# Patient Record
Sex: FEMALE | Race: OTHER | HISPANIC OR LATINO
[De-identification: names, ages, dates, MRNs, and addresses within clinical notes are randomized per-mention and may not be internally consistent; named-entity substitution may affect disease eponyms.]

---

## 2019-06-24 ENCOUNTER — APPOINTMENT (OUTPATIENT)
Dept: ANTEPARTUM | Facility: CLINIC | Age: 34
End: 2019-06-24
Payer: COMMERCIAL

## 2019-06-24 PROBLEM — Z00.00 ENCOUNTER FOR PREVENTIVE HEALTH EXAMINATION: Status: ACTIVE | Noted: 2019-06-24

## 2019-06-24 PROCEDURE — 76801 OB US < 14 WKS SINGLE FETUS: CPT

## 2019-06-24 PROCEDURE — 76813 OB US NUCHAL MEAS 1 GEST: CPT

## 2019-06-24 PROCEDURE — 36415 COLL VENOUS BLD VENIPUNCTURE: CPT

## 2019-08-16 ENCOUNTER — APPOINTMENT (OUTPATIENT)
Dept: ANTEPARTUM | Facility: CLINIC | Age: 34
End: 2019-08-16
Payer: COMMERCIAL

## 2019-08-16 PROCEDURE — 76811 OB US DETAILED SNGL FETUS: CPT

## 2019-08-16 PROCEDURE — 76817 TRANSVAGINAL US OBSTETRIC: CPT | Mod: 59

## 2019-09-03 ENCOUNTER — APPOINTMENT (OUTPATIENT)
Dept: ANTEPARTUM | Facility: CLINIC | Age: 34
End: 2019-09-03
Payer: COMMERCIAL

## 2019-09-03 PROCEDURE — 76816 OB US FOLLOW-UP PER FETUS: CPT

## 2019-10-17 ENCOUNTER — APPOINTMENT (OUTPATIENT)
Dept: ANTEPARTUM | Facility: CLINIC | Age: 34
End: 2019-10-17
Payer: COMMERCIAL

## 2019-10-17 PROCEDURE — 76817 TRANSVAGINAL US OBSTETRIC: CPT

## 2019-10-17 PROCEDURE — 76816 OB US FOLLOW-UP PER FETUS: CPT

## 2019-11-04 ENCOUNTER — OUTPATIENT (OUTPATIENT)
Dept: OUTPATIENT SERVICES | Facility: HOSPITAL | Age: 34
LOS: 1 days | End: 2019-11-04
Payer: COMMERCIAL

## 2019-11-04 DIAGNOSIS — Z3A.00 WEEKS OF GESTATION OF PREGNANCY NOT SPECIFIED: ICD-10-CM

## 2019-11-04 DIAGNOSIS — O26.899 OTHER SPECIFIED PREGNANCY RELATED CONDITIONS, UNSPECIFIED TRIMESTER: ICD-10-CM

## 2019-11-04 LAB
AMNISURE ROM (RUPTURE OF MEMBRANES): NEGATIVE — SIGNIFICANT CHANGE UP
APPEARANCE UR: CLEAR — SIGNIFICANT CHANGE UP
BILIRUB UR-MCNC: NEGATIVE — SIGNIFICANT CHANGE UP
COLOR SPEC: YELLOW — SIGNIFICANT CHANGE UP
DIFF PNL FLD: NEGATIVE — SIGNIFICANT CHANGE UP
GLUCOSE UR QL: NEGATIVE — SIGNIFICANT CHANGE UP
KETONES UR-MCNC: NEGATIVE — SIGNIFICANT CHANGE UP
LEUKOCYTE ESTERASE UR-ACNC: ABNORMAL
NITRITE UR-MCNC: NEGATIVE — SIGNIFICANT CHANGE UP
PH UR: 8 — SIGNIFICANT CHANGE UP (ref 5–8)
PROT UR-MCNC: NEGATIVE MG/DL — SIGNIFICANT CHANGE UP
SP GR SPEC: 1.01 — SIGNIFICANT CHANGE UP (ref 1–1.03)
UROBILINOGEN FLD QL: 0.2 E.U./DL — SIGNIFICANT CHANGE UP

## 2019-11-04 PROCEDURE — 84112 EVAL AMNIOTIC FLUID PROTEIN: CPT

## 2019-11-04 PROCEDURE — 96360 HYDRATION IV INFUSION INIT: CPT

## 2019-11-04 PROCEDURE — 99214 OFFICE O/P EST MOD 30 MIN: CPT

## 2019-11-04 PROCEDURE — 81001 URINALYSIS AUTO W/SCOPE: CPT

## 2019-11-04 PROCEDURE — 76830 TRANSVAGINAL US NON-OB: CPT | Mod: 26

## 2019-11-04 PROCEDURE — 87086 URINE CULTURE/COLONY COUNT: CPT

## 2019-11-04 PROCEDURE — 99203 OFFICE O/P NEW LOW 30 MIN: CPT | Mod: 25

## 2019-11-04 RX ORDER — SODIUM CHLORIDE 9 MG/ML
1000 INJECTION, SOLUTION INTRAVENOUS
Refills: 0 | Status: DISCONTINUED | OUTPATIENT
Start: 2019-11-04 | End: 2019-11-22

## 2019-11-04 RX ADMIN — Medication 0.25 MILLIGRAM(S): at 18:57

## 2019-11-06 LAB
CULTURE RESULTS: SIGNIFICANT CHANGE UP
SPECIMEN SOURCE: SIGNIFICANT CHANGE UP

## 2019-12-24 ENCOUNTER — OUTPATIENT (OUTPATIENT)
Dept: OUTPATIENT SERVICES | Facility: HOSPITAL | Age: 34
LOS: 1 days | End: 2019-12-24
Payer: COMMERCIAL

## 2019-12-24 DIAGNOSIS — Z3A.00 WEEKS OF GESTATION OF PREGNANCY NOT SPECIFIED: ICD-10-CM

## 2019-12-24 DIAGNOSIS — O26.899 OTHER SPECIFIED PREGNANCY RELATED CONDITIONS, UNSPECIFIED TRIMESTER: ICD-10-CM

## 2019-12-24 PROCEDURE — 76818 FETAL BIOPHYS PROFILE W/NST: CPT

## 2019-12-24 PROCEDURE — 94760 N-INVAS EAR/PLS OXIMETRY 1: CPT

## 2019-12-24 PROCEDURE — 99214 OFFICE O/P EST MOD 30 MIN: CPT

## 2019-12-28 ENCOUNTER — OUTPATIENT (OUTPATIENT)
Dept: OUTPATIENT SERVICES | Facility: HOSPITAL | Age: 34
LOS: 1 days | End: 2019-12-28
Payer: COMMERCIAL

## 2019-12-28 DIAGNOSIS — O26.899 OTHER SPECIFIED PREGNANCY RELATED CONDITIONS, UNSPECIFIED TRIMESTER: ICD-10-CM

## 2019-12-28 DIAGNOSIS — Z3A.00 WEEKS OF GESTATION OF PREGNANCY NOT SPECIFIED: ICD-10-CM

## 2019-12-28 LAB
BLD GP AB SCN SERPL QL: NEGATIVE — SIGNIFICANT CHANGE UP
HCT VFR BLD CALC: 36.6 % — SIGNIFICANT CHANGE UP (ref 34.5–45)
HGB BLD-MCNC: 11.6 G/DL — SIGNIFICANT CHANGE UP (ref 11.5–15.5)
MCHC RBC-ENTMCNC: 28.3 PG — SIGNIFICANT CHANGE UP (ref 27–34)
MCHC RBC-ENTMCNC: 31.7 GM/DL — LOW (ref 32–36)
MCV RBC AUTO: 89.3 FL — SIGNIFICANT CHANGE UP (ref 80–100)
NRBC # BLD: 0 /100 WBCS — SIGNIFICANT CHANGE UP (ref 0–0)
PLATELET # BLD AUTO: 196 K/UL — SIGNIFICANT CHANGE UP (ref 150–400)
RBC # BLD: 4.1 M/UL — SIGNIFICANT CHANGE UP (ref 3.8–5.2)
RBC # FLD: 15.9 % — HIGH (ref 10.3–14.5)
RH IG SCN BLD-IMP: POSITIVE — SIGNIFICANT CHANGE UP
WBC # BLD: 7.64 K/UL — SIGNIFICANT CHANGE UP (ref 3.8–10.5)
WBC # FLD AUTO: 7.64 K/UL — SIGNIFICANT CHANGE UP (ref 3.8–10.5)

## 2019-12-28 PROCEDURE — 86900 BLOOD TYPING SEROLOGIC ABO: CPT

## 2019-12-28 PROCEDURE — 86780 TREPONEMA PALLIDUM: CPT

## 2019-12-28 PROCEDURE — 85027 COMPLETE CBC AUTOMATED: CPT

## 2019-12-28 PROCEDURE — 94760 N-INVAS EAR/PLS OXIMETRY 1: CPT

## 2019-12-28 PROCEDURE — 99214 OFFICE O/P EST MOD 30 MIN: CPT

## 2019-12-28 PROCEDURE — 59025 FETAL NON-STRESS TEST: CPT

## 2019-12-28 PROCEDURE — 86901 BLOOD TYPING SEROLOGIC RH(D): CPT

## 2019-12-28 PROCEDURE — 36415 COLL VENOUS BLD VENIPUNCTURE: CPT

## 2019-12-28 PROCEDURE — 86850 RBC ANTIBODY SCREEN: CPT

## 2019-12-29 LAB — T PALLIDUM AB TITR SER: NEGATIVE — SIGNIFICANT CHANGE UP

## 2019-12-30 ENCOUNTER — RESULT REVIEW (OUTPATIENT)
Age: 34
End: 2019-12-30

## 2019-12-30 ENCOUNTER — INPATIENT (INPATIENT)
Facility: HOSPITAL | Age: 34
LOS: 2 days | Discharge: HOME | End: 2020-01-02
Attending: OBSTETRICS & GYNECOLOGY | Admitting: OBSTETRICS & GYNECOLOGY
Payer: COMMERCIAL

## 2019-12-30 ENCOUNTER — TRANSCRIPTION ENCOUNTER (OUTPATIENT)
Age: 34
End: 2019-12-30

## 2019-12-30 VITALS — WEIGHT: 134.48 LBS | HEIGHT: 62 IN

## 2019-12-30 PROCEDURE — 88307 TISSUE EXAM BY PATHOLOGIST: CPT | Mod: 26

## 2019-12-30 RX ORDER — SODIUM CHLORIDE 9 MG/ML
1000 INJECTION, SOLUTION INTRAVENOUS ONCE
Refills: 0 | Status: COMPLETED | OUTPATIENT
Start: 2019-12-30 | End: 2019-12-30

## 2019-12-30 RX ORDER — OXYCODONE HYDROCHLORIDE 5 MG/1
5 TABLET ORAL ONCE
Refills: 0 | Status: DISCONTINUED | OUTPATIENT
Start: 2019-12-30 | End: 2020-01-02

## 2019-12-30 RX ORDER — OXYTOCIN 10 UNIT/ML
333.33 VIAL (ML) INJECTION
Qty: 20 | Refills: 0 | Status: DISCONTINUED | OUTPATIENT
Start: 2019-12-30 | End: 2020-01-02

## 2019-12-30 RX ORDER — GLYCERIN ADULT
1 SUPPOSITORY, RECTAL RECTAL AT BEDTIME
Refills: 0 | Status: DISCONTINUED | OUTPATIENT
Start: 2019-12-30 | End: 2020-01-02

## 2019-12-30 RX ORDER — SODIUM CHLORIDE 9 MG/ML
1000 INJECTION, SOLUTION INTRAVENOUS
Refills: 0 | Status: DISCONTINUED | OUTPATIENT
Start: 2019-12-30 | End: 2019-12-30

## 2019-12-30 RX ORDER — IBUPROFEN 200 MG
600 TABLET ORAL EVERY 6 HOURS
Refills: 0 | Status: COMPLETED | OUTPATIENT
Start: 2019-12-30 | End: 2020-11-27

## 2019-12-30 RX ORDER — NALOXONE HYDROCHLORIDE 4 MG/.1ML
0.1 SPRAY NASAL
Refills: 0 | Status: DISCONTINUED | OUTPATIENT
Start: 2019-12-30 | End: 2020-01-02

## 2019-12-30 RX ORDER — OXYTOCIN 10 UNIT/ML
333.33 VIAL (ML) INJECTION
Qty: 20 | Refills: 0 | Status: DISCONTINUED | OUTPATIENT
Start: 2019-12-30 | End: 2019-12-30

## 2019-12-30 RX ORDER — TETANUS TOXOID, REDUCED DIPHTHERIA TOXOID AND ACELLULAR PERTUSSIS VACCINE, ADSORBED 5; 2.5; 8; 8; 2.5 [IU]/.5ML; [IU]/.5ML; UG/.5ML; UG/.5ML; UG/.5ML
0.5 SUSPENSION INTRAMUSCULAR ONCE
Refills: 0 | Status: DISCONTINUED | OUTPATIENT
Start: 2019-12-30 | End: 2020-01-02

## 2019-12-30 RX ORDER — ACETAMINOPHEN 500 MG
975 TABLET ORAL
Refills: 0 | Status: DISCONTINUED | OUTPATIENT
Start: 2019-12-30 | End: 2020-01-02

## 2019-12-30 RX ORDER — FAMOTIDINE 10 MG/ML
20 INJECTION INTRAVENOUS ONCE
Refills: 0 | Status: COMPLETED | OUTPATIENT
Start: 2019-12-30 | End: 2019-12-30

## 2019-12-30 RX ORDER — SODIUM CHLORIDE 9 MG/ML
1000 INJECTION, SOLUTION INTRAVENOUS
Refills: 0 | Status: DISCONTINUED | OUTPATIENT
Start: 2019-12-30 | End: 2020-01-02

## 2019-12-30 RX ORDER — CEFAZOLIN SODIUM 1 G
2000 VIAL (EA) INJECTION ONCE
Refills: 0 | Status: COMPLETED | OUTPATIENT
Start: 2019-12-30 | End: 2019-12-30

## 2019-12-30 RX ORDER — SIMETHICONE 80 MG/1
80 TABLET, CHEWABLE ORAL EVERY 4 HOURS
Refills: 0 | Status: DISCONTINUED | OUTPATIENT
Start: 2019-12-30 | End: 2020-01-02

## 2019-12-30 RX ORDER — KETOROLAC TROMETHAMINE 30 MG/ML
30 SYRINGE (ML) INJECTION EVERY 6 HOURS
Refills: 0 | Status: DISCONTINUED | OUTPATIENT
Start: 2019-12-30 | End: 2019-12-31

## 2019-12-30 RX ORDER — ONDANSETRON 8 MG/1
4 TABLET, FILM COATED ORAL EVERY 6 HOURS
Refills: 0 | Status: DISCONTINUED | OUTPATIENT
Start: 2019-12-30 | End: 2020-01-02

## 2019-12-30 RX ORDER — LANOLIN
1 OINTMENT (GRAM) TOPICAL EVERY 6 HOURS
Refills: 0 | Status: DISCONTINUED | OUTPATIENT
Start: 2019-12-30 | End: 2020-01-02

## 2019-12-30 RX ORDER — METOCLOPRAMIDE HCL 10 MG
10 TABLET ORAL ONCE
Refills: 0 | Status: DISCONTINUED | OUTPATIENT
Start: 2019-12-30 | End: 2019-12-30

## 2019-12-30 RX ORDER — MORPHINE SULFATE 50 MG/1
0.2 CAPSULE, EXTENDED RELEASE ORAL ONCE
Refills: 0 | Status: DISCONTINUED | OUTPATIENT
Start: 2019-12-30 | End: 2020-01-02

## 2019-12-30 RX ORDER — DIPHENHYDRAMINE HCL 50 MG
25 CAPSULE ORAL EVERY 6 HOURS
Refills: 0 | Status: DISCONTINUED | OUTPATIENT
Start: 2019-12-30 | End: 2020-01-02

## 2019-12-30 RX ORDER — OXYCODONE HYDROCHLORIDE 5 MG/1
5 TABLET ORAL ONCE
Refills: 0 | Status: DISCONTINUED | OUTPATIENT
Start: 2019-12-30 | End: 2019-12-30

## 2019-12-30 RX ORDER — OXYCODONE HYDROCHLORIDE 5 MG/1
5 TABLET ORAL
Refills: 0 | Status: DISCONTINUED | OUTPATIENT
Start: 2019-12-30 | End: 2020-01-02

## 2019-12-30 RX ORDER — CITRIC ACID/SODIUM CITRATE 300-500 MG
30 SOLUTION, ORAL ORAL ONCE
Refills: 0 | Status: COMPLETED | OUTPATIENT
Start: 2019-12-30 | End: 2019-12-30

## 2019-12-30 RX ORDER — NALBUPHINE HYDROCHLORIDE 10 MG/ML
2.5 INJECTION, SOLUTION INTRAMUSCULAR; INTRAVENOUS; SUBCUTANEOUS EVERY 6 HOURS
Refills: 0 | Status: DISCONTINUED | OUTPATIENT
Start: 2019-12-30 | End: 2020-01-02

## 2019-12-30 RX ORDER — MAGNESIUM HYDROXIDE 400 MG/1
30 TABLET, CHEWABLE ORAL
Refills: 0 | Status: DISCONTINUED | OUTPATIENT
Start: 2019-12-30 | End: 2020-01-02

## 2019-12-30 RX ADMIN — Medication 1000 MILLIUNIT(S)/MIN: at 10:13

## 2019-12-30 RX ADMIN — Medication 30 MILLIGRAM(S): at 17:34

## 2019-12-30 RX ADMIN — FAMOTIDINE 20 MILLIGRAM(S): 10 INJECTION INTRAVENOUS at 08:15

## 2019-12-30 RX ADMIN — Medication 30 MILLIGRAM(S): at 13:28

## 2019-12-30 RX ADMIN — Medication 30 MILLILITER(S): at 08:15

## 2019-12-30 RX ADMIN — NALBUPHINE HYDROCHLORIDE 2.5 MILLIGRAM(S): 10 INJECTION, SOLUTION INTRAMUSCULAR; INTRAVENOUS; SUBCUTANEOUS at 13:28

## 2019-12-30 RX ADMIN — SODIUM CHLORIDE 125 MILLILITER(S): 9 INJECTION, SOLUTION INTRAVENOUS at 07:42

## 2019-12-30 RX ADMIN — Medication 975 MILLIGRAM(S): at 21:34

## 2019-12-30 RX ADMIN — SODIUM CHLORIDE 125 MILLILITER(S): 9 INJECTION, SOLUTION INTRAVENOUS at 19:51

## 2019-12-30 RX ADMIN — Medication 30 MILLIGRAM(S): at 00:40

## 2019-12-30 RX ADMIN — Medication 30 MILLIGRAM(S): at 18:42

## 2019-12-30 RX ADMIN — Medication 30 MILLIGRAM(S): at 12:11

## 2019-12-30 RX ADMIN — Medication 100 MILLIGRAM(S): at 08:15

## 2019-12-30 RX ADMIN — SODIUM CHLORIDE 2000 MILLILITER(S): 9 INJECTION, SOLUTION INTRAVENOUS at 07:06

## 2019-12-30 RX ADMIN — OXYCODONE HYDROCHLORIDE 5 MILLIGRAM(S): 5 TABLET ORAL at 23:00

## 2019-12-30 RX ADMIN — NALBUPHINE HYDROCHLORIDE 2.5 MILLIGRAM(S): 10 INJECTION, SOLUTION INTRAMUSCULAR; INTRAVENOUS; SUBCUTANEOUS at 13:29

## 2019-12-30 RX ADMIN — OXYCODONE HYDROCHLORIDE 5 MILLIGRAM(S): 5 TABLET ORAL at 22:20

## 2019-12-30 RX ADMIN — Medication 975 MILLIGRAM(S): at 22:10

## 2019-12-30 NOTE — PROGRESS NOTE ADULT - SUBJECTIVE AND OBJECTIVE BOX
Section Operative Note      Pre-Op Diagnosis:  Elelctive  primary    secoin    Post-Op Diagnosis:  same    Time Out: 	[x ] Performed		[ ]Not Performed:  Procedure: 	 Section: 	[ ] Repeat 	#_______	[ x] Primary         [ ]Emergency	        [ ]Other____________:  Uterine incision:         [ x]Low Transverse C/S	[ ]Low Vertical C/S           [ ]Classical C/S							  Additional Procedures: 	[ ] Bilateral Tubal Ligation.  Type:______________  Other:	[ ]Lysis of Adhesions   	[ ]C-Hysterectomy          [ ]Other__________________:  Surgeon:  Dr. Smith                                                   .	Assist:   __Dr ENAMORADO _________________.                                                                             .   Anesthesia: _______BOK_______________________	Type: [ ]Epidural  [ x] Spinal   [ ] General	[ ]Other:  IV Fluids:150CC IVRL 20  U PTIOCIN 2  G  ANCEF 					Urine Output:		Dotson:  [ ] Yes [ ]No [ ] Other:  EBL:    	900CC	  Delivery findings:    [X ] Live 	[ ]Non-Viable: 	[ ]MALE   [ X]FEMALE, Infant. APGAR        9     AND    9             .  [X ] Peds at delivery.  [ ]MULTIPLE GESTATION -NOTES:      POSITION:      LOT                  PRESENTATION          VTX                       .  DELIVERED BY:  	[ X]HEAD 		[ ]BREECH 	[ ]OTHER:  		[X ]MANUAL 		[ ]VACUUM	[ ] OTHER:	  PLACENTA: 	[x ]Removed	[ x]Uterus explored		[x ]Empty		[ ]Other 		  		UTERUS:  	[ x]Normal		[ ]Fibroids		[ ] Other:  ADNEXA: 	[ x]Right Normal	[ ]Other:  	[x ]Left Normal	[ ]Other :  PELVIS:	[ x]Normal		[ ]Adhesions [ ]Mild  [ ]Moderate [ ]Severe  Procedure:  	Patient in supine position.    Skin Incision	[x ]Pfannenstiel	[ ]Other:			[ ]Old scar  removal.  		Fascial Incision	[ x]Transverse 	[ ]Other:		  Peritoneal incision	[x ]Performed	[ x]Vertical  [ ]Other:		[ ]Lysis of Adhesions  		Bladder Flap	[ ]Created	[ ]Not Created  		Uterine Incision	[x ]Low transverse	[ ]Low Vertical	[ ]Classical  [ ]Other:   	Uterine Repair	[ x]_#_1_______Layers-Using__1 chromic______________ sutures 	[x ] hemostasis  excellent.  				[ ]Other:                       .                      Abdominal Cavity	[x ]Cleared of clots and debris  Rectus  Muscles  	Reapproximated [x ]Yes Using__1 chromic______________ sutures. [ ]Not Re- Approximated.  Fascial Reapproximation 	[ x]UsingUsing___1 Vicril_____________ sutures [ ]Other:                                 .	  Subcutaneous Tissue 	[x ]Irrigated  and hemostasis assured:[x ]Reapproximated Using____2-0____________ sutures.  Skin Reapproximation:	[x ]Subcuticular Using________________ sutures [x ] Insorb Staples   [ ]Skin Staples [ ]Other:  Dressing applied  and Patient to RR in  [x ] Stable [ ] Other ;                         condition.	  End of Operation;	[x ] Sponge/lap/needle count correct.  [ ] Not correct.  [ ]Not Done [ ]X-ray=Clear  Pathology:	[ x]Placenta.   	[ ]Fallopian Tube Portions [ ]Right [ ]Left.	[ ]Other:                                           	Complications/Attending’s Notes:	[ ] None.  	[ ] Other:                                                                        [ ]CONTINUATION OF NOTES NEXT PAGE:

## 2019-12-30 NOTE — DISCHARGE NOTE OB - CARE PROVIDER_API CALL
Tim Smith)  Obstetrics and Gynecology  [address] [place] [id number]  Phone: [phone]  Fax: [phone]  Follow Up Time:

## 2019-12-30 NOTE — DISCHARGE NOTE OB - PATIENT PORTAL LINK FT
You can access the FollowMyHealth Patient Portal offered by [facility] by registering at the following website: [email or web address]. By joining "[employer]’s FollowMyHealth portal, you will also be able to view your health information using other applications (apps) compatible with our system.

## 2019-12-30 NOTE — DISCHARGE NOTE OB - CARE PLAN
Principal Discharge DX:	Normal postpartum course  Goal:	Home  Assessment and plan of treatment:	Nothing  per  vagina  no  heavy  lifting for  six  weeks ,  No  sex No  tub bathc no swimming Principal Discharge DX:	Normal postpartum course  Goal:	Home  Assessment and plan of treatment:	Nothing  per  vagina  no  heavy  lifting for  six  weeks ,  No  sex No  tub bath no swimming

## 2019-12-30 NOTE — DISCHARGE NOTE OB - PLAN OF CARE
Home Nothing  per  vagina  no  heavy  lifting for  six  weeks ,  No  sex No  tub bathc no swimming Nothing  per  vagina  no  heavy  lifting for  six  weeks ,  No  sex No  tub bath no swimming

## 2019-12-31 LAB
BASOPHILS # BLD AUTO: 0.02 K/UL — SIGNIFICANT CHANGE UP (ref 0–0.2)
BASOPHILS NFR BLD AUTO: 0.2 % — SIGNIFICANT CHANGE UP (ref 0–2)
EOSINOPHIL # BLD AUTO: 0.14 K/UL — SIGNIFICANT CHANGE UP (ref 0–0.5)
EOSINOPHIL NFR BLD AUTO: 1.4 % — SIGNIFICANT CHANGE UP (ref 0–6)
HCT VFR BLD CALC: 30.9 % — LOW (ref 34.5–45)
HGB BLD-MCNC: 9.9 G/DL — LOW (ref 11.5–15.5)
IMM GRANULOCYTES NFR BLD AUTO: 0.4 % — SIGNIFICANT CHANGE UP (ref 0–1.5)
LYMPHOCYTES # BLD AUTO: 1.56 K/UL — SIGNIFICANT CHANGE UP (ref 1–3.3)
LYMPHOCYTES # BLD AUTO: 16.1 % — SIGNIFICANT CHANGE UP (ref 13–44)
MCHC RBC-ENTMCNC: 28.4 PG — SIGNIFICANT CHANGE UP (ref 27–34)
MCHC RBC-ENTMCNC: 32 GM/DL — SIGNIFICANT CHANGE UP (ref 32–36)
MCV RBC AUTO: 88.8 FL — SIGNIFICANT CHANGE UP (ref 80–100)
MONOCYTES # BLD AUTO: 0.64 K/UL — SIGNIFICANT CHANGE UP (ref 0–0.9)
MONOCYTES NFR BLD AUTO: 6.6 % — SIGNIFICANT CHANGE UP (ref 2–14)
NEUTROPHILS # BLD AUTO: 7.3 K/UL — SIGNIFICANT CHANGE UP (ref 1.8–7.4)
NEUTROPHILS NFR BLD AUTO: 75.3 % — SIGNIFICANT CHANGE UP (ref 43–77)
NRBC # BLD: 0 /100 WBCS — SIGNIFICANT CHANGE UP (ref 0–0)
PLATELET # BLD AUTO: 159 K/UL — SIGNIFICANT CHANGE UP (ref 150–400)
RBC # BLD: 3.48 M/UL — LOW (ref 3.8–5.2)
RBC # FLD: 16.4 % — HIGH (ref 10.3–14.5)
WBC # BLD: 9.7 K/UL — SIGNIFICANT CHANGE UP (ref 3.8–10.5)
WBC # FLD AUTO: 9.7 K/UL — SIGNIFICANT CHANGE UP (ref 3.8–10.5)

## 2019-12-31 RX ORDER — OXYCODONE HYDROCHLORIDE 5 MG/1
5 TABLET ORAL ONCE
Refills: 0 | Status: DISCONTINUED | OUTPATIENT
Start: 2019-12-31 | End: 2019-12-31

## 2019-12-31 RX ORDER — IBUPROFEN 200 MG
600 TABLET ORAL EVERY 6 HOURS
Refills: 0 | Status: DISCONTINUED | OUTPATIENT
Start: 2019-12-31 | End: 2020-01-02

## 2019-12-31 RX ORDER — OXYCODONE HYDROCHLORIDE 5 MG/1
5 TABLET ORAL EVERY 6 HOURS
Refills: 0 | Status: DISCONTINUED | OUTPATIENT
Start: 2019-12-31 | End: 2020-01-01

## 2019-12-31 RX ADMIN — Medication 975 MILLIGRAM(S): at 09:59

## 2019-12-31 RX ADMIN — Medication 975 MILLIGRAM(S): at 21:00

## 2019-12-31 RX ADMIN — SIMETHICONE 80 MILLIGRAM(S): 80 TABLET, CHEWABLE ORAL at 21:48

## 2019-12-31 RX ADMIN — Medication 30 MILLIGRAM(S): at 06:15

## 2019-12-31 RX ADMIN — OXYCODONE HYDROCHLORIDE 5 MILLIGRAM(S): 5 TABLET ORAL at 12:47

## 2019-12-31 RX ADMIN — Medication 25 MILLIGRAM(S): at 00:43

## 2019-12-31 RX ADMIN — Medication 975 MILLIGRAM(S): at 05:00

## 2019-12-31 RX ADMIN — Medication 600 MILLIGRAM(S): at 19:22

## 2019-12-31 RX ADMIN — NALBUPHINE HYDROCHLORIDE 2.5 MILLIGRAM(S): 10 INJECTION, SOLUTION INTRAMUSCULAR; INTRAVENOUS; SUBCUTANEOUS at 04:10

## 2019-12-31 RX ADMIN — Medication 600 MILLIGRAM(S): at 23:10

## 2019-12-31 RX ADMIN — NALBUPHINE HYDROCHLORIDE 2.5 MILLIGRAM(S): 10 INJECTION, SOLUTION INTRAMUSCULAR; INTRAVENOUS; SUBCUTANEOUS at 05:00

## 2019-12-31 RX ADMIN — SIMETHICONE 80 MILLIGRAM(S): 80 TABLET, CHEWABLE ORAL at 17:51

## 2019-12-31 RX ADMIN — Medication 600 MILLIGRAM(S): at 11:53

## 2019-12-31 RX ADMIN — MAGNESIUM HYDROXIDE 30 MILLILITER(S): 400 TABLET, CHEWABLE ORAL at 19:34

## 2019-12-31 RX ADMIN — Medication 600 MILLIGRAM(S): at 11:16

## 2019-12-31 RX ADMIN — SIMETHICONE 80 MILLIGRAM(S): 80 TABLET, CHEWABLE ORAL at 09:54

## 2019-12-31 RX ADMIN — Medication 600 MILLIGRAM(S): at 18:27

## 2019-12-31 RX ADMIN — OXYCODONE HYDROCHLORIDE 5 MILLIGRAM(S): 5 TABLET ORAL at 18:30

## 2019-12-31 RX ADMIN — Medication 975 MILLIGRAM(S): at 14:22

## 2019-12-31 RX ADMIN — Medication 30 MILLIGRAM(S): at 07:00

## 2019-12-31 RX ADMIN — SIMETHICONE 80 MILLIGRAM(S): 80 TABLET, CHEWABLE ORAL at 13:19

## 2019-12-31 RX ADMIN — OXYCODONE HYDROCHLORIDE 5 MILLIGRAM(S): 5 TABLET ORAL at 17:50

## 2019-12-31 RX ADMIN — OXYCODONE HYDROCHLORIDE 5 MILLIGRAM(S): 5 TABLET ORAL at 11:53

## 2019-12-31 RX ADMIN — Medication 975 MILLIGRAM(S): at 04:11

## 2019-12-31 RX ADMIN — Medication 975 MILLIGRAM(S): at 15:15

## 2019-12-31 RX ADMIN — Medication 975 MILLIGRAM(S): at 20:31

## 2019-12-31 RX ADMIN — Medication 975 MILLIGRAM(S): at 10:30

## 2019-12-31 RX ADMIN — Medication 30 MILLIGRAM(S): at 00:05

## 2019-12-31 RX ADMIN — OXYCODONE HYDROCHLORIDE 5 MILLIGRAM(S): 5 TABLET ORAL at 19:34

## 2019-12-31 NOTE — PROGRESS NOTE ADULT - SUBJECTIVE AND OBJECTIVE BOX
Patient evaluated at bedside this morning, resting comfortable in bed, with no acute events overnight.  She reports pain is well controlled with motrin and tylenol.  She denies headache, dizziness, chest pain, palpitations, shortness of breath, nausea, vomiting or heavy vaginal bleeding.  She has not tried ambulating since procedure, shi removed last night and passing trial of voids. Tolerating clear liquids.     Physical Exam:  Vital Signs Last 24 Hrs  T(C): 36.8 (31 Dec 2019 02:00), Max: 36.8 (31 Dec 2019 02:00)  T(F): 98.3 (31 Dec 2019 02:00), Max: 98.3 (31 Dec 2019 02:00)  HR: 66 (31 Dec 2019 02:00) (66 - 85)  BP: 93/48 (31 Dec 2019 02:00) (93/48 - 118/54)  BP(mean): --  RR: 17 (31 Dec 2019 02:00) (17 - 18)  SpO2: 98% (31 Dec 2019 02:00) (96% - 99%)    GA: NAD, A+0 x 3  CV: RRR, no murmurs/rubs  Pulm: CTAB, no wheezes/rhonchi  Breasts: soft, nontender, no palpable masses  Abd: + BS, soft, nontender, nondistended, no rebound or guarding, incision clean, dry and intact, uterus firm at midline and below umbilicus  : shi in situ, lochia WNL  Extremities: no swelling or calf tenderness, reflexes +2 bilaterally, SCD in place                  acetaminophen   Tablet .. 975 milliGRAM(s) Oral <User Schedule>  diphenhydrAMINE 25 milliGRAM(s) Oral every 6 hours PRN  diphtheria/tetanus/pertussis (acellular) Vaccine (ADAcel) 0.5 milliLiter(s) IntraMuscular once  glycerin Suppository - Adult 1 Suppository(s) Rectal at bedtime PRN  ibuprofen  Tablet. 600 milliGRAM(s) Oral every 6 hours  lactated ringers. 1000 milliLiter(s) IV Continuous <Continuous>  lanolin Ointment 1 Application(s) Topical every 6 hours PRN  magnesium hydroxide Suspension 30 milliLiter(s) Oral two times a day PRN  morphine PF Spinal 0.2 milliGRAM(s) IntraThecal. once  nalbuphine Injectable 2.5 milliGRAM(s) IV Push every 6 hours PRN  naloxone Injectable 0.1 milliGRAM(s) IV Push every 3 minutes PRN  ondansetron Injectable 4 milliGRAM(s) IV Push every 6 hours PRN  oxyCODONE    IR 5 milliGRAM(s) Oral every 3 hours PRN  oxyCODONE    IR 5 milliGRAM(s) Oral once PRN  oxytocin Infusion 333.333 milliUNIT(s)/Min IV Continuous <Continuous>  simethicone 80 milliGRAM(s) Chew every 4 hours PRN Patient evaluated at bedside this morning, resting comfortable in bed, with no acute events overnight.  She reports pain is well controlled with motrin, tylenol, oxycodone.  Endorsed pain last night so did not attempt ambulation.  She denies headache, dizziness, chest pain, palpitations, shortness of breath, nausea, vomiting or heavy vaginal bleeding.  She has not tried ambulating since procedure, shi removed last night and passing trial of voids. Tolerating clear liquids.     Physical Exam:  Vital Signs Last 24 Hrs  T(C): 36.8 (31 Dec 2019 02:00), Max: 36.8 (31 Dec 2019 02:00)  T(F): 98.3 (31 Dec 2019 02:00), Max: 98.3 (31 Dec 2019 02:00)  HR: 66 (31 Dec 2019 02:00) (66 - 85)  BP: 93/48 (31 Dec 2019 02:00) (93/48 - 118/54)  BP(mean): --  RR: 17 (31 Dec 2019 02:00) (17 - 18)  SpO2: 98% (31 Dec 2019 02:00) (96% - 99%)    GA: NAD, A+0 x 3  CV: RRR, no murmurs/rubs  Pulm: CTAB, no wheezes/rhonchi  Breasts: soft, nontender, no palpable masses  Abd: + BS, soft, nontender, nondistended, no rebound or guarding, incision clean, dry and intact, uterus firm at midline and below umbilicus  : shi in situ, lochia WNL  Extremities: no swelling or calf tenderness, reflexes +2 bilaterally, SCD in place                  acetaminophen   Tablet .. 975 milliGRAM(s) Oral <User Schedule>  diphenhydrAMINE 25 milliGRAM(s) Oral every 6 hours PRN  diphtheria/tetanus/pertussis (acellular) Vaccine (ADAcel) 0.5 milliLiter(s) IntraMuscular once  glycerin Suppository - Adult 1 Suppository(s) Rectal at bedtime PRN  ibuprofen  Tablet. 600 milliGRAM(s) Oral every 6 hours  lactated ringers. 1000 milliLiter(s) IV Continuous <Continuous>  lanolin Ointment 1 Application(s) Topical every 6 hours PRN  magnesium hydroxide Suspension 30 milliLiter(s) Oral two times a day PRN  morphine PF Spinal 0.2 milliGRAM(s) IntraThecal. once  nalbuphine Injectable 2.5 milliGRAM(s) IV Push every 6 hours PRN  naloxone Injectable 0.1 milliGRAM(s) IV Push every 3 minutes PRN  ondansetron Injectable 4 milliGRAM(s) IV Push every 6 hours PRN  oxyCODONE    IR 5 milliGRAM(s) Oral every 3 hours PRN  oxyCODONE    IR 5 milliGRAM(s) Oral once PRN  oxytocin Infusion 333.333 milliUNIT(s)/Min IV Continuous <Continuous>  simethicone 80 milliGRAM(s) Chew every 4 hours PRN

## 2019-12-31 NOTE — PROGRESS NOTE ADULT - SUBJECTIVE AND OBJECTIVE BOX
OB/GYN ATTENDING POSTOP ROUNDS                                    Subjective:  34yFemale    Complaints: [x ]None	[ ]Other:      Objective:  		Vital Signs:  T(C): 36.6 (19 @ 06:00), Max: 36.8 (19 @ 02:00)  HR: 61 (19 @ 06:00) (61 - 85)  BP: 90/51 (19 @ 06:00) (90/51 - 118/54)  RR: 17 (19 @ 06:00) (17 - 18)  SpO2: 97% (19 @ 06:00) (96% - 99%)  Wt(kg): --     @ 07:01  -   @ 07:00  --------------------------------------------------------  IN: 3550 mL / OUT: 4695 mL / NET: -1145 mL        		General:      NAD 	[x ] Yes 	[ ]No,	 A&O X3	[ x] Yes  [ ] No			  		Abdomen:   Palpation  	[x ]Normal	[ ]Other:	  	   Incision:              [x  ]Intact	   [ x] Clean	[x ] Dry    [   ]other:  BS		[ x]Normal 	[ ]Other:  			  LE:  	Calf tenderness    	[ x]None		[x ]No  Sign of DVT	[ ]Other:  		Lochia:	 		[ x]Normal,	[ ]Other:  		Labs:	                      9.9    9.70  )-----------( 159      ( 31 Dec 2019 08:21 )             30.9   	       Assessment:  			[x ] Post-op  Section  			Day #_1_______  				[ ] Other:     	Plan:	1.  Supportive Care		[ ]Other:                                           2. Pain:		[ x] Well Controlled 	[ x] Other:DISCUSSED  RISKS  OF STAYING IN  BED  EMPHASIZED  IMPORTACE  OF  AMBULATUIO AND GETTING OUT  OF BED ,  MATERIAL  RISKS  OF  IMMOBILITY  DISCSSED  AMONG  THEM  DVT  FEVER  INFECTIONS  ETC .  	           	     	3. Misc.		[ x]Continue current care                                            4. Discharge home on 2 if stable	[ ] Other:  		  Notes:			[x ] None			[ ] Other:      													Tim Smith MD [phone]

## 2019-12-31 NOTE — LACTATION INITIAL EVALUATION - NS LACT CON REASON FOR REQ
Met dyad at ~ 32 hours. Weight loss and diaper count WDL. Mother with nipple soreness. Importance of deep latch explained and technique for acquiring deep latch taught. Baby was able to latch deeply, sucking rhythmically between periods of rest. Breastfeeding basics and normal  behavior discussed. Baby has been bottle fed several times at mother’s request due to pain level (incision). Supported the mother in her decisions. Implications and risks of formula or mixed feeding was discussed. Supply and demand feedback system for milk production explained. Encouraged frequent SSC and to breastfeed in response to cues at least 8-12x/day, or as tolerated. Pumping and/or hand expression recommended for any missed feeds. Encouraged to call for additional support as needed from RN or LC./primaparous mom

## 2020-01-01 RX ORDER — POLYETHYLENE GLYCOL 3350 17 G/17G
17 POWDER, FOR SOLUTION ORAL DAILY
Refills: 0 | Status: DISCONTINUED | OUTPATIENT
Start: 2020-01-01 | End: 2020-01-02

## 2020-01-01 RX ORDER — OXYCODONE HYDROCHLORIDE 5 MG/1
10 TABLET ORAL ONCE
Refills: 0 | Status: DISCONTINUED | OUTPATIENT
Start: 2020-01-01 | End: 2020-01-01

## 2020-01-01 RX ADMIN — Medication 975 MILLIGRAM(S): at 04:00

## 2020-01-01 RX ADMIN — Medication 600 MILLIGRAM(S): at 05:47

## 2020-01-01 RX ADMIN — Medication 975 MILLIGRAM(S): at 15:05

## 2020-01-01 RX ADMIN — OXYCODONE HYDROCHLORIDE 5 MILLIGRAM(S): 5 TABLET ORAL at 09:50

## 2020-01-01 RX ADMIN — OXYCODONE HYDROCHLORIDE 5 MILLIGRAM(S): 5 TABLET ORAL at 01:37

## 2020-01-01 RX ADMIN — OXYCODONE HYDROCHLORIDE 5 MILLIGRAM(S): 5 TABLET ORAL at 10:30

## 2020-01-01 RX ADMIN — Medication 975 MILLIGRAM(S): at 21:30

## 2020-01-01 RX ADMIN — OXYCODONE HYDROCHLORIDE 10 MILLIGRAM(S): 5 TABLET ORAL at 23:15

## 2020-01-01 RX ADMIN — Medication 975 MILLIGRAM(S): at 03:12

## 2020-01-01 RX ADMIN — Medication 975 MILLIGRAM(S): at 10:00

## 2020-01-01 RX ADMIN — Medication 975 MILLIGRAM(S): at 20:51

## 2020-01-01 RX ADMIN — OXYCODONE HYDROCHLORIDE 5 MILLIGRAM(S): 5 TABLET ORAL at 16:30

## 2020-01-01 RX ADMIN — SIMETHICONE 80 MILLIGRAM(S): 80 TABLET, CHEWABLE ORAL at 09:21

## 2020-01-01 RX ADMIN — Medication 600 MILLIGRAM(S): at 18:58

## 2020-01-01 RX ADMIN — Medication 600 MILLIGRAM(S): at 18:17

## 2020-01-01 RX ADMIN — OXYCODONE HYDROCHLORIDE 5 MILLIGRAM(S): 5 TABLET ORAL at 15:49

## 2020-01-01 RX ADMIN — OXYCODONE HYDROCHLORIDE 5 MILLIGRAM(S): 5 TABLET ORAL at 02:15

## 2020-01-01 RX ADMIN — Medication 975 MILLIGRAM(S): at 09:21

## 2020-01-01 RX ADMIN — Medication 600 MILLIGRAM(S): at 12:09

## 2020-01-01 RX ADMIN — SIMETHICONE 80 MILLIGRAM(S): 80 TABLET, CHEWABLE ORAL at 15:49

## 2020-01-01 RX ADMIN — Medication 600 MILLIGRAM(S): at 00:00

## 2020-01-01 RX ADMIN — Medication 600 MILLIGRAM(S): at 13:00

## 2020-01-01 RX ADMIN — Medication 975 MILLIGRAM(S): at 15:45

## 2020-01-01 RX ADMIN — SIMETHICONE 80 MILLIGRAM(S): 80 TABLET, CHEWABLE ORAL at 03:12

## 2020-01-01 RX ADMIN — Medication 600 MILLIGRAM(S): at 06:30

## 2020-01-01 NOTE — PROGRESS NOTE ADULT - SUBJECTIVE AND OBJECTIVE BOX
Patient evaluated at bedside this morning, resting comfortable in bed.   She reports pain is not well controlled with tylenol, motrin and oxycodone. Reporting feeling extreme gas pain although is ambulating and passing gas.   She denies headache, dizziness, chest pain, palpitations, shortness of breathe, nausea, vomiting or heavy vaginal bleeding.  She has been ambulating without assistance, voiding spontaneously, passing gas, tolerating regular diet and is breastfeeding.    Physical Exam:  Vital Signs Last 24 Hrs  T(C): 36.2 (01 Jan 2020 06:12), Max: 37 (31 Dec 2019 22:15)  T(F): 97.2 (01 Jan 2020 06:12), Max: 98.6 (31 Dec 2019 22:15)  HR: 67 (01 Jan 2020 06:12) (67 - 90)  BP: 113/70 (01 Jan 2020 06:12) (93/55 - 113/70)  BP(mean): --  RR: 17 (01 Jan 2020 06:12) (17 - 17)  SpO2: 100% (01 Jan 2020 06:12) (97% - 100%)    GA: NAD, A+0 x 3  CV: RRR  Pulm: CTAB  Breasts: soft, nontender, no palpable masses  Abd: + BS, soft, tender to palpation in all 4 quadrants, nondistended, no rebound or guarding, incision clean, dry and intact, uterus firm at midline,  2 fb below umbilicus  : lochia WNL  Extremities: no swelling or calf tenderness                             9.9    9.70  )-----------( 159      ( 31 Dec 2019 08:21 )             30.9

## 2020-01-02 VITALS
SYSTOLIC BLOOD PRESSURE: 115 MMHG | OXYGEN SATURATION: 98 % | TEMPERATURE: 98 F | RESPIRATION RATE: 17 BRPM | HEART RATE: 67 BPM | DIASTOLIC BLOOD PRESSURE: 75 MMHG

## 2020-01-02 PROCEDURE — 85025 COMPLETE CBC W/AUTO DIFF WBC: CPT

## 2020-01-02 PROCEDURE — 36415 COLL VENOUS BLD VENIPUNCTURE: CPT

## 2020-01-02 PROCEDURE — 88307 TISSUE EXAM BY PATHOLOGIST: CPT

## 2020-01-02 RX ORDER — OXYCODONE HYDROCHLORIDE 5 MG/1
5 TABLET ORAL ONCE
Refills: 0 | Status: DISCONTINUED | OUTPATIENT
Start: 2020-01-02 | End: 2020-01-02

## 2020-01-02 RX ADMIN — OXYCODONE HYDROCHLORIDE 5 MILLIGRAM(S): 5 TABLET ORAL at 12:00

## 2020-01-02 RX ADMIN — Medication 600 MILLIGRAM(S): at 07:30

## 2020-01-02 RX ADMIN — OXYCODONE HYDROCHLORIDE 5 MILLIGRAM(S): 5 TABLET ORAL at 12:50

## 2020-01-02 RX ADMIN — SIMETHICONE 80 MILLIGRAM(S): 80 TABLET, CHEWABLE ORAL at 09:00

## 2020-01-02 RX ADMIN — Medication 600 MILLIGRAM(S): at 06:53

## 2020-01-02 RX ADMIN — Medication 600 MILLIGRAM(S): at 11:33

## 2020-01-02 RX ADMIN — Medication 975 MILLIGRAM(S): at 05:00

## 2020-01-02 RX ADMIN — Medication 975 MILLIGRAM(S): at 08:59

## 2020-01-02 RX ADMIN — Medication 975 MILLIGRAM(S): at 09:50

## 2020-01-02 RX ADMIN — POLYETHYLENE GLYCOL 3350 17 GRAM(S): 17 POWDER, FOR SOLUTION ORAL at 00:09

## 2020-01-02 RX ADMIN — Medication 975 MILLIGRAM(S): at 04:20

## 2020-01-02 RX ADMIN — OXYCODONE HYDROCHLORIDE 10 MILLIGRAM(S): 5 TABLET ORAL at 00:00

## 2020-01-02 NOTE — PROGRESS NOTE ADULT - SUBJECTIVE AND OBJECTIVE BOX
Patient evaluated at bedside this morning, resting comfortable in bed.   She reports pain is well controlled with tylenol and motrin with oxycodone for breakthrough.  She denies headache, dizziness, chest pain, palpitations, shortness of breath, nausea, vomiting or heavy vaginal bleeding.  She has been ambulating without assistance, voiding spontaneously, passing gas, tolerating regular diet and is breastfeeding.    Physical Exam:  Vital Signs Last 24 Hrs  T(C): 36.9 (01 Jan 2020 22:25), Max: 36.9 (01 Jan 2020 22:25)  T(F): 98.5 (01 Jan 2020 22:25), Max: 98.5 (01 Jan 2020 22:25)  HR: 69 (01 Jan 2020 22:25) (69 - 89)  BP: 114/71 (01 Jan 2020 22:25) (114/71 - 117/67)  BP(mean): --  RR: 18 (01 Jan 2020 22:25) (17 - 18)  SpO2: 98% (01 Jan 2020 22:25) (98% - 99%)    GA: NAD, A+0 x 3  CV: RRR  Pulm: CTAB  Breasts: soft, nontender, no palpable masses  Abd: + BS, soft, nontender, nondistended, no rebound or guarding, incision clean, dry and intact, uterus firm at midline and below umbilicus  : lochia WNL  Extremities: no swelling or calf tenderness                             9.9    9.70  )-----------( 159      ( 31 Dec 2019 08:21 )             30.9

## 2020-01-02 NOTE — PROGRESS NOTE ADULT - ASSESSMENT
A/P   34y  s/p  section, POD #1, stable  -  Pain: PO motrin and tylenol, oxycodone for severe pain PRN  -  Post-operatively labs: post-op Hgb , hemodynamically stable, no symptoms of anemia   -  GI: tolerating clears, passing gas, ADAT  -  : shi in situ; DC this AM, f/u TOV  -  DVT prophylaxis: ambulation, SCDs  -  Dispo: POD 3 or 4
A/P   34y  s/p  section, POD #2, stable  -  Pain: PO motrin and tylenol, oxycodone for severe pain PRN, will give another 5 of oxycodon and reassess pain in 1 hour  -  Post-operatively labs: post-op Hgb 9.9, hemodynamically stable, no symptoms of anemia   -  GI: tolerating clears, passing gas, ADAT  -  : shi in situ; DC this AM, f/u TOV  -  DVT prophylaxis: ambulation, SCDs  -  Dispo: POD 3 or 4
A/P: 34y s/p  section, POD#3, stable  -  Pain: PO motrin q6hrs, tylenol q6hrs, oxycodone for severe pain PRN  -  Post-operatively labs: post-op Hgb , hemodynamically stable, no symptoms of anemia   -  GI: tolerating regular diet, passing gas, colace PRN  -  : s/p shi , urinating without difficulty  -  DVT prophylaxis: encouraged increased ambulation, SCDs  -  Dispo: POD 3 or 4
PO
PO

## 2020-01-06 DIAGNOSIS — Z3A.39 39 WEEKS GESTATION OF PREGNANCY: ICD-10-CM

## 2020-01-06 DIAGNOSIS — Z34.03 ENCOUNTER FOR SUPERVISION OF NORMAL FIRST PREGNANCY, THIRD TRIMESTER: ICD-10-CM

## 2020-01-06 LAB — SURGICAL PATHOLOGY STUDY: SIGNIFICANT CHANGE UP

## 2021-05-13 ENCOUNTER — APPOINTMENT (OUTPATIENT)
Dept: ANTEPARTUM | Facility: CLINIC | Age: 36
End: 2021-05-13
Payer: COMMERCIAL

## 2021-05-13 PROCEDURE — 99072 ADDL SUPL MATRL&STAF TM PHE: CPT

## 2021-05-13 PROCEDURE — 76813 OB US NUCHAL MEAS 1 GEST: CPT

## 2021-05-13 PROCEDURE — 76801 OB US < 14 WKS SINGLE FETUS: CPT

## 2021-06-14 ENCOUNTER — APPOINTMENT (OUTPATIENT)
Dept: ANTEPARTUM | Facility: CLINIC | Age: 36
End: 2021-06-14

## 2021-07-19 ENCOUNTER — APPOINTMENT (OUTPATIENT)
Dept: ANTEPARTUM | Facility: CLINIC | Age: 36
End: 2021-07-19
Payer: COMMERCIAL

## 2021-07-19 ENCOUNTER — ASOB RESULT (OUTPATIENT)
Age: 36
End: 2021-07-19

## 2021-07-19 PROCEDURE — 76811 OB US DETAILED SNGL FETUS: CPT

## 2021-07-19 PROCEDURE — 76817 TRANSVAGINAL US OBSTETRIC: CPT | Mod: 59

## 2021-07-19 PROCEDURE — 99072 ADDL SUPL MATRL&STAF TM PHE: CPT

## 2021-09-20 ENCOUNTER — APPOINTMENT (OUTPATIENT)
Dept: ANTEPARTUM | Facility: CLINIC | Age: 36
End: 2021-09-20
Payer: COMMERCIAL

## 2021-09-20 ENCOUNTER — ASOB RESULT (OUTPATIENT)
Age: 36
End: 2021-09-20

## 2021-09-20 PROCEDURE — 76819 FETAL BIOPHYS PROFIL W/O NST: CPT

## 2021-09-20 PROCEDURE — 76816 OB US FOLLOW-UP PER FETUS: CPT

## 2021-10-25 ENCOUNTER — ASOB RESULT (OUTPATIENT)
Age: 36
End: 2021-10-25

## 2021-10-25 ENCOUNTER — APPOINTMENT (OUTPATIENT)
Dept: ANTEPARTUM | Facility: CLINIC | Age: 36
End: 2021-10-25
Payer: COMMERCIAL

## 2021-10-25 PROCEDURE — 76816 OB US FOLLOW-UP PER FETUS: CPT

## 2021-10-25 PROCEDURE — 76819 FETAL BIOPHYS PROFIL W/O NST: CPT

## 2021-11-01 ENCOUNTER — ASOB RESULT (OUTPATIENT)
Age: 36
End: 2021-11-01

## 2021-11-01 ENCOUNTER — APPOINTMENT (OUTPATIENT)
Dept: ANTEPARTUM | Facility: CLINIC | Age: 36
End: 2021-11-01
Payer: COMMERCIAL

## 2021-11-01 PROCEDURE — 76819 FETAL BIOPHYS PROFIL W/O NST: CPT

## 2021-11-01 PROCEDURE — 76816 OB US FOLLOW-UP PER FETUS: CPT

## 2021-11-09 ENCOUNTER — ASOB RESULT (OUTPATIENT)
Age: 36
End: 2021-11-09

## 2021-11-09 ENCOUNTER — APPOINTMENT (OUTPATIENT)
Dept: ANTEPARTUM | Facility: CLINIC | Age: 36
End: 2021-11-09
Payer: COMMERCIAL

## 2021-11-09 PROCEDURE — 76818 FETAL BIOPHYS PROFILE W/NST: CPT

## 2021-11-09 PROCEDURE — 76816 OB US FOLLOW-UP PER FETUS: CPT

## 2021-11-15 ENCOUNTER — APPOINTMENT (OUTPATIENT)
Dept: ANTEPARTUM | Facility: CLINIC | Age: 36
End: 2021-11-15

## 2021-11-16 ENCOUNTER — ASOB RESULT (OUTPATIENT)
Age: 36
End: 2021-11-16

## 2021-11-16 ENCOUNTER — APPOINTMENT (OUTPATIENT)
Dept: ANTEPARTUM | Facility: CLINIC | Age: 36
End: 2021-11-16
Payer: COMMERCIAL

## 2021-11-16 PROCEDURE — 76819 FETAL BIOPHYS PROFIL W/O NST: CPT

## 2021-11-20 ENCOUNTER — OUTPATIENT (OUTPATIENT)
Dept: OUTPATIENT SERVICES | Facility: HOSPITAL | Age: 36
LOS: 1 days | End: 2021-11-20
Payer: COMMERCIAL

## 2021-11-20 DIAGNOSIS — Z01.818 ENCOUNTER FOR OTHER PREPROCEDURAL EXAMINATION: ICD-10-CM

## 2021-11-20 LAB
BASOPHILS # BLD AUTO: 0.01 K/UL — SIGNIFICANT CHANGE UP (ref 0–0.2)
BASOPHILS NFR BLD AUTO: 0.1 % — SIGNIFICANT CHANGE UP (ref 0–2)
BLD GP AB SCN SERPL QL: NEGATIVE — SIGNIFICANT CHANGE UP
BLD GP AB SCN SERPL QL: NEGATIVE — SIGNIFICANT CHANGE UP
EOSINOPHIL # BLD AUTO: 0.07 K/UL — SIGNIFICANT CHANGE UP (ref 0–0.5)
EOSINOPHIL NFR BLD AUTO: 0.8 % — SIGNIFICANT CHANGE UP (ref 0–6)
HCT VFR BLD CALC: 36.7 % — SIGNIFICANT CHANGE UP (ref 34.5–45)
HGB BLD-MCNC: 11.4 G/DL — LOW (ref 11.5–15.5)
IMM GRANULOCYTES NFR BLD AUTO: 0.6 % — SIGNIFICANT CHANGE UP (ref 0–1.5)
LYMPHOCYTES # BLD AUTO: 1.65 K/UL — SIGNIFICANT CHANGE UP (ref 1–3.3)
LYMPHOCYTES # BLD AUTO: 19.3 % — SIGNIFICANT CHANGE UP (ref 13–44)
MCHC RBC-ENTMCNC: 28 PG — SIGNIFICANT CHANGE UP (ref 27–34)
MCHC RBC-ENTMCNC: 31.1 GM/DL — LOW (ref 32–36)
MCV RBC AUTO: 90.2 FL — SIGNIFICANT CHANGE UP (ref 80–100)
MONOCYTES # BLD AUTO: 0.58 K/UL — SIGNIFICANT CHANGE UP (ref 0–0.9)
MONOCYTES NFR BLD AUTO: 6.8 % — SIGNIFICANT CHANGE UP (ref 2–14)
NEUTROPHILS # BLD AUTO: 6.21 K/UL — SIGNIFICANT CHANGE UP (ref 1.8–7.4)
NEUTROPHILS NFR BLD AUTO: 72.4 % — SIGNIFICANT CHANGE UP (ref 43–77)
NRBC # BLD: 0 /100 WBCS — SIGNIFICANT CHANGE UP (ref 0–0)
PLATELET # BLD AUTO: 316 K/UL — SIGNIFICANT CHANGE UP (ref 150–400)
RBC # BLD: 4.07 M/UL — SIGNIFICANT CHANGE UP (ref 3.8–5.2)
RBC # FLD: 14.5 % — SIGNIFICANT CHANGE UP (ref 10.3–14.5)
RH IG SCN BLD-IMP: POSITIVE — SIGNIFICANT CHANGE UP
RH IG SCN BLD-IMP: POSITIVE — SIGNIFICANT CHANGE UP
WBC # BLD: 8.57 K/UL — SIGNIFICANT CHANGE UP (ref 3.8–10.5)
WBC # FLD AUTO: 8.57 K/UL — SIGNIFICANT CHANGE UP (ref 3.8–10.5)

## 2021-11-20 PROCEDURE — 85025 COMPLETE CBC W/AUTO DIFF WBC: CPT

## 2021-11-20 PROCEDURE — 86780 TREPONEMA PALLIDUM: CPT

## 2021-11-20 PROCEDURE — 86900 BLOOD TYPING SEROLOGIC ABO: CPT

## 2021-11-20 PROCEDURE — 86901 BLOOD TYPING SEROLOGIC RH(D): CPT

## 2021-11-20 PROCEDURE — 86850 RBC ANTIBODY SCREEN: CPT

## 2021-11-22 ENCOUNTER — TRANSCRIPTION ENCOUNTER (OUTPATIENT)
Age: 36
End: 2021-11-22

## 2021-11-22 LAB — T PALLIDUM AB TITR SER: NEGATIVE — SIGNIFICANT CHANGE UP

## 2021-11-23 ENCOUNTER — APPOINTMENT (OUTPATIENT)
Dept: ANTEPARTUM | Facility: CLINIC | Age: 36
End: 2021-11-23

## 2021-11-23 ENCOUNTER — INPATIENT (INPATIENT)
Facility: HOSPITAL | Age: 36
LOS: 1 days | Discharge: HOME | End: 2021-11-25
Attending: OBSTETRICS & GYNECOLOGY | Admitting: OBSTETRICS & GYNECOLOGY
Payer: COMMERCIAL

## 2021-11-23 VITALS
DIASTOLIC BLOOD PRESSURE: 53 MMHG | RESPIRATION RATE: 17 BRPM | TEMPERATURE: 98 F | SYSTOLIC BLOOD PRESSURE: 90 MMHG | HEART RATE: 72 BPM | OXYGEN SATURATION: 95 %

## 2021-11-23 LAB
BASOPHILS # BLD AUTO: 0.01 K/UL — SIGNIFICANT CHANGE UP (ref 0–0.2)
BASOPHILS NFR BLD AUTO: 0.1 % — SIGNIFICANT CHANGE UP (ref 0–2)
BLD GP AB SCN SERPL QL: NEGATIVE — SIGNIFICANT CHANGE UP
COVID-19 SPIKE DOMAIN AB INTERP: POSITIVE
COVID-19 SPIKE DOMAIN ANTIBODY RESULT: >250 U/ML — HIGH
EOSINOPHIL # BLD AUTO: 0.06 K/UL — SIGNIFICANT CHANGE UP (ref 0–0.5)
EOSINOPHIL NFR BLD AUTO: 0.6 % — SIGNIFICANT CHANGE UP (ref 0–6)
HCT VFR BLD CALC: 35.2 % — SIGNIFICANT CHANGE UP (ref 34.5–45)
HGB BLD-MCNC: 11.5 G/DL — SIGNIFICANT CHANGE UP (ref 11.5–15.5)
IMM GRANULOCYTES NFR BLD AUTO: 0.5 % — SIGNIFICANT CHANGE UP (ref 0–1.5)
LYMPHOCYTES # BLD AUTO: 1.66 K/UL — SIGNIFICANT CHANGE UP (ref 1–3.3)
LYMPHOCYTES # BLD AUTO: 17.6 % — SIGNIFICANT CHANGE UP (ref 13–44)
MCHC RBC-ENTMCNC: 28.5 PG — SIGNIFICANT CHANGE UP (ref 27–34)
MCHC RBC-ENTMCNC: 32.7 GM/DL — SIGNIFICANT CHANGE UP (ref 32–36)
MCV RBC AUTO: 87.1 FL — SIGNIFICANT CHANGE UP (ref 80–100)
MONOCYTES # BLD AUTO: 0.67 K/UL — SIGNIFICANT CHANGE UP (ref 0–0.9)
MONOCYTES NFR BLD AUTO: 7.1 % — SIGNIFICANT CHANGE UP (ref 2–14)
NEUTROPHILS # BLD AUTO: 6.98 K/UL — SIGNIFICANT CHANGE UP (ref 1.8–7.4)
NEUTROPHILS NFR BLD AUTO: 74.1 % — SIGNIFICANT CHANGE UP (ref 43–77)
NRBC # BLD: 0 /100 WBCS — SIGNIFICANT CHANGE UP (ref 0–0)
PLATELET # BLD AUTO: 274 K/UL — SIGNIFICANT CHANGE UP (ref 150–400)
RBC # BLD: 4.04 M/UL — SIGNIFICANT CHANGE UP (ref 3.8–5.2)
RBC # FLD: 14.4 % — SIGNIFICANT CHANGE UP (ref 10.3–14.5)
RH IG SCN BLD-IMP: POSITIVE — SIGNIFICANT CHANGE UP
SARS-COV-2 IGG+IGM SERPL QL IA: >250 U/ML — HIGH
SARS-COV-2 IGG+IGM SERPL QL IA: POSITIVE
T PALLIDUM AB TITR SER: NEGATIVE — SIGNIFICANT CHANGE UP
WBC # BLD: 9.43 K/UL — SIGNIFICANT CHANGE UP (ref 3.8–10.5)
WBC # FLD AUTO: 9.43 K/UL — SIGNIFICANT CHANGE UP (ref 3.8–10.5)

## 2021-11-23 RX ORDER — SODIUM CHLORIDE 9 MG/ML
1000 INJECTION, SOLUTION INTRAVENOUS
Refills: 0 | Status: DISCONTINUED | OUTPATIENT
Start: 2021-11-23 | End: 2021-11-25

## 2021-11-23 RX ORDER — LANOLIN
1 OINTMENT (GRAM) TOPICAL EVERY 6 HOURS
Refills: 0 | Status: DISCONTINUED | OUTPATIENT
Start: 2021-11-23 | End: 2021-11-25

## 2021-11-23 RX ORDER — OXYCODONE HYDROCHLORIDE 5 MG/1
5 TABLET ORAL ONCE
Refills: 0 | Status: DISCONTINUED | OUTPATIENT
Start: 2021-11-23 | End: 2021-11-25

## 2021-11-23 RX ORDER — KETOROLAC TROMETHAMINE 30 MG/ML
30 SYRINGE (ML) INJECTION EVERY 6 HOURS
Refills: 0 | Status: DISCONTINUED | OUTPATIENT
Start: 2021-11-23 | End: 2021-11-25

## 2021-11-23 RX ORDER — DIPHENHYDRAMINE HCL 50 MG
25 CAPSULE ORAL EVERY 6 HOURS
Refills: 0 | Status: COMPLETED | OUTPATIENT
Start: 2021-11-23 | End: 2022-10-22

## 2021-11-23 RX ORDER — ONDANSETRON 8 MG/1
4 TABLET, FILM COATED ORAL EVERY 6 HOURS
Refills: 0 | Status: DISCONTINUED | OUTPATIENT
Start: 2021-11-23 | End: 2021-11-23

## 2021-11-23 RX ORDER — DIPHENHYDRAMINE HCL 50 MG
25 CAPSULE ORAL EVERY 6 HOURS
Refills: 0 | Status: DISCONTINUED | OUTPATIENT
Start: 2021-11-23 | End: 2021-11-25

## 2021-11-23 RX ORDER — SODIUM CHLORIDE 9 MG/ML
1000 INJECTION, SOLUTION INTRAVENOUS
Refills: 0 | Status: DISCONTINUED | OUTPATIENT
Start: 2021-11-23 | End: 2021-11-23

## 2021-11-23 RX ORDER — TETANUS TOXOID, REDUCED DIPHTHERIA TOXOID AND ACELLULAR PERTUSSIS VACCINE, ADSORBED 5; 2.5; 8; 8; 2.5 [IU]/.5ML; [IU]/.5ML; UG/.5ML; UG/.5ML; UG/.5ML
0.5 SUSPENSION INTRAMUSCULAR ONCE
Refills: 0 | Status: COMPLETED | OUTPATIENT
Start: 2021-11-23

## 2021-11-23 RX ORDER — SIMETHICONE 80 MG/1
80 TABLET, CHEWABLE ORAL EVERY 4 HOURS
Refills: 0 | Status: DISCONTINUED | OUTPATIENT
Start: 2021-11-23 | End: 2021-11-25

## 2021-11-23 RX ORDER — CITRIC ACID/SODIUM CITRATE 300-500 MG
30 SOLUTION, ORAL ORAL ONCE
Refills: 0 | Status: COMPLETED | OUTPATIENT
Start: 2021-11-23 | End: 2021-11-23

## 2021-11-23 RX ORDER — SERTRALINE 25 MG/1
50 TABLET, FILM COATED ORAL EVERY 24 HOURS
Refills: 0 | Status: DISCONTINUED | OUTPATIENT
Start: 2021-11-23 | End: 2021-11-25

## 2021-11-23 RX ORDER — SERTRALINE 25 MG/1
1 TABLET, FILM COATED ORAL
Qty: 0 | Refills: 0 | DISCHARGE

## 2021-11-23 RX ORDER — OXYTOCIN 10 UNIT/ML
333.33 VIAL (ML) INJECTION
Qty: 20 | Refills: 0 | Status: DISCONTINUED | OUTPATIENT
Start: 2021-11-23 | End: 2021-11-25

## 2021-11-23 RX ORDER — NALOXONE HYDROCHLORIDE 4 MG/.1ML
0.1 SPRAY NASAL
Refills: 0 | Status: DISCONTINUED | OUTPATIENT
Start: 2021-11-23 | End: 2021-11-23

## 2021-11-23 RX ORDER — OXYCODONE HYDROCHLORIDE 5 MG/1
5 TABLET ORAL
Refills: 0 | Status: COMPLETED | OUTPATIENT
Start: 2021-11-23 | End: 2021-11-30

## 2021-11-23 RX ORDER — MAGNESIUM HYDROXIDE 400 MG/1
30 TABLET, CHEWABLE ORAL
Refills: 0 | Status: DISCONTINUED | OUTPATIENT
Start: 2021-11-23 | End: 2021-11-25

## 2021-11-23 RX ORDER — ACETAMINOPHEN 500 MG
975 TABLET ORAL
Refills: 0 | Status: DISCONTINUED | OUTPATIENT
Start: 2021-11-23 | End: 2021-11-25

## 2021-11-23 RX ORDER — CHLORHEXIDINE GLUCONATE 213 G/1000ML
1 SOLUTION TOPICAL ONCE
Refills: 0 | Status: DISCONTINUED | OUTPATIENT
Start: 2021-11-23 | End: 2021-11-23

## 2021-11-23 RX ORDER — FAMOTIDINE 10 MG/ML
20 INJECTION INTRAVENOUS ONCE
Refills: 0 | Status: COMPLETED | OUTPATIENT
Start: 2021-11-23 | End: 2021-11-23

## 2021-11-23 RX ORDER — ACETAMINOPHEN 500 MG
1000 TABLET ORAL ONCE
Refills: 0 | Status: COMPLETED | OUTPATIENT
Start: 2021-11-23 | End: 2021-11-23

## 2021-11-23 RX ORDER — OXYCODONE HYDROCHLORIDE 5 MG/1
5 TABLET ORAL
Refills: 0 | Status: DISCONTINUED | OUTPATIENT
Start: 2021-11-23 | End: 2021-11-25

## 2021-11-23 RX ORDER — IBUPROFEN 200 MG
600 TABLET ORAL EVERY 6 HOURS
Refills: 0 | Status: DISCONTINUED | OUTPATIENT
Start: 2021-11-23 | End: 2021-11-25

## 2021-11-23 RX ORDER — DIPHENHYDRAMINE HCL 50 MG
25 CAPSULE ORAL ONCE
Refills: 0 | Status: COMPLETED | OUTPATIENT
Start: 2021-11-23 | End: 2021-11-23

## 2021-11-23 RX ORDER — OXYTOCIN 10 UNIT/ML
333.33 VIAL (ML) INJECTION
Qty: 20 | Refills: 0 | Status: DISCONTINUED | OUTPATIENT
Start: 2021-11-23 | End: 2021-11-23

## 2021-11-23 RX ORDER — SODIUM CHLORIDE 9 MG/ML
1000 INJECTION, SOLUTION INTRAVENOUS ONCE
Refills: 0 | Status: COMPLETED | OUTPATIENT
Start: 2021-11-23 | End: 2021-11-23

## 2021-11-23 RX ORDER — CEFAZOLIN SODIUM 1 G
2000 VIAL (EA) INJECTION ONCE
Refills: 0 | Status: COMPLETED | OUTPATIENT
Start: 2021-11-23 | End: 2021-11-23

## 2021-11-23 RX ORDER — DEXAMETHASONE 0.5 MG/5ML
4 ELIXIR ORAL EVERY 6 HOURS
Refills: 0 | Status: DISCONTINUED | OUTPATIENT
Start: 2021-11-23 | End: 2021-11-23

## 2021-11-23 RX ADMIN — Medication 975 MILLIGRAM(S): at 18:49

## 2021-11-23 RX ADMIN — Medication 975 MILLIGRAM(S): at 23:14

## 2021-11-23 RX ADMIN — Medication 400 MILLIGRAM(S): at 12:53

## 2021-11-23 RX ADMIN — Medication 975 MILLIGRAM(S): at 18:10

## 2021-11-23 RX ADMIN — Medication 30 MILLIGRAM(S): at 19:13

## 2021-11-23 RX ADMIN — FAMOTIDINE 20 MILLIGRAM(S): 10 INJECTION INTRAVENOUS at 10:26

## 2021-11-23 RX ADMIN — Medication 100 MILLIGRAM(S): at 10:26

## 2021-11-23 RX ADMIN — SODIUM CHLORIDE 2000 MILLILITER(S): 9 INJECTION, SOLUTION INTRAVENOUS at 10:25

## 2021-11-23 RX ADMIN — SERTRALINE 50 MILLIGRAM(S): 25 TABLET, FILM COATED ORAL at 23:14

## 2021-11-23 RX ADMIN — Medication 30 MILLIGRAM(S): at 14:45

## 2021-11-23 RX ADMIN — Medication 25 MILLIGRAM(S): at 21:10

## 2021-11-23 RX ADMIN — Medication 25 MILLIGRAM(S): at 15:29

## 2021-11-23 RX ADMIN — Medication 30 MILLILITER(S): at 10:26

## 2021-11-23 RX ADMIN — Medication 30 MILLIGRAM(S): at 20:59

## 2021-11-24 LAB
BASOPHILS # BLD AUTO: 0.02 K/UL — SIGNIFICANT CHANGE UP (ref 0–0.2)
BASOPHILS NFR BLD AUTO: 0.2 % — SIGNIFICANT CHANGE UP (ref 0–2)
COVID-19 NUCLEOCAPSID GAM AB INTERP: NEGATIVE — SIGNIFICANT CHANGE UP
COVID-19 NUCLEOCAPSID TOTAL GAM ANTIBODY RESULT: 0.07 INDEX — SIGNIFICANT CHANGE UP
EOSINOPHIL # BLD AUTO: 0.06 K/UL — SIGNIFICANT CHANGE UP (ref 0–0.5)
EOSINOPHIL NFR BLD AUTO: 0.5 % — SIGNIFICANT CHANGE UP (ref 0–6)
HCT VFR BLD CALC: 29 % — LOW (ref 34.5–45)
HGB BLD-MCNC: 9.3 G/DL — LOW (ref 11.5–15.5)
IMM GRANULOCYTES NFR BLD AUTO: 0.4 % — SIGNIFICANT CHANGE UP (ref 0–1.5)
LYMPHOCYTES # BLD AUTO: 19.3 % — SIGNIFICANT CHANGE UP (ref 13–44)
LYMPHOCYTES # BLD AUTO: 2.5 K/UL — SIGNIFICANT CHANGE UP (ref 1–3.3)
MCHC RBC-ENTMCNC: 28.4 PG — SIGNIFICANT CHANGE UP (ref 27–34)
MCHC RBC-ENTMCNC: 32.1 GM/DL — SIGNIFICANT CHANGE UP (ref 32–36)
MCV RBC AUTO: 88.7 FL — SIGNIFICANT CHANGE UP (ref 80–100)
MONOCYTES # BLD AUTO: 1.04 K/UL — HIGH (ref 0–0.9)
MONOCYTES NFR BLD AUTO: 8 % — SIGNIFICANT CHANGE UP (ref 2–14)
NEUTROPHILS # BLD AUTO: 9.27 K/UL — HIGH (ref 1.8–7.4)
NEUTROPHILS NFR BLD AUTO: 71.6 % — SIGNIFICANT CHANGE UP (ref 43–77)
NRBC # BLD: 0 /100 WBCS — SIGNIFICANT CHANGE UP (ref 0–0)
PLATELET # BLD AUTO: 224 K/UL — SIGNIFICANT CHANGE UP (ref 150–400)
RBC # BLD: 3.27 M/UL — LOW (ref 3.8–5.2)
RBC # FLD: 14.4 % — SIGNIFICANT CHANGE UP (ref 10.3–14.5)
SARS-COV-2 IGG+IGM SERPL QL IA: 0.07 INDEX — SIGNIFICANT CHANGE UP
SARS-COV-2 IGG+IGM SERPL QL IA: NEGATIVE — SIGNIFICANT CHANGE UP
WBC # BLD: 12.94 K/UL — HIGH (ref 3.8–10.5)
WBC # FLD AUTO: 12.94 K/UL — HIGH (ref 3.8–10.5)

## 2021-11-24 RX ORDER — LANOLIN ALCOHOL/MO/W.PET/CERES
5 CREAM (GRAM) TOPICAL ONCE
Refills: 0 | Status: COMPLETED | OUTPATIENT
Start: 2021-11-24 | End: 2021-11-24

## 2021-11-24 RX ORDER — LANOLIN ALCOHOL/MO/W.PET/CERES
5 CREAM (GRAM) TOPICAL AT BEDTIME
Refills: 0 | Status: DISCONTINUED | OUTPATIENT
Start: 2021-11-24 | End: 2021-11-25

## 2021-11-24 RX ADMIN — Medication 975 MILLIGRAM(S): at 06:50

## 2021-11-24 RX ADMIN — Medication 30 MILLIGRAM(S): at 09:20

## 2021-11-24 RX ADMIN — OXYCODONE HYDROCHLORIDE 5 MILLIGRAM(S): 5 TABLET ORAL at 14:07

## 2021-11-24 RX ADMIN — OXYCODONE HYDROCHLORIDE 5 MILLIGRAM(S): 5 TABLET ORAL at 22:28

## 2021-11-24 RX ADMIN — Medication 975 MILLIGRAM(S): at 12:31

## 2021-11-24 RX ADMIN — SIMETHICONE 80 MILLIGRAM(S): 80 TABLET, CHEWABLE ORAL at 13:07

## 2021-11-24 RX ADMIN — OXYCODONE HYDROCHLORIDE 5 MILLIGRAM(S): 5 TABLET ORAL at 13:07

## 2021-11-24 RX ADMIN — Medication 30 MILLIGRAM(S): at 03:00

## 2021-11-24 RX ADMIN — Medication 30 MILLIGRAM(S): at 10:20

## 2021-11-24 RX ADMIN — OXYCODONE HYDROCHLORIDE 5 MILLIGRAM(S): 5 TABLET ORAL at 07:44

## 2021-11-24 RX ADMIN — Medication 30 MILLIGRAM(S): at 15:45

## 2021-11-24 RX ADMIN — Medication 30 MILLIGRAM(S): at 21:15

## 2021-11-24 RX ADMIN — OXYCODONE HYDROCHLORIDE 5 MILLIGRAM(S): 5 TABLET ORAL at 23:10

## 2021-11-24 RX ADMIN — Medication 30 MILLIGRAM(S): at 15:31

## 2021-11-24 RX ADMIN — OXYCODONE HYDROCHLORIDE 5 MILLIGRAM(S): 5 TABLET ORAL at 06:54

## 2021-11-24 RX ADMIN — Medication 30 MILLIGRAM(S): at 20:32

## 2021-11-24 RX ADMIN — Medication 975 MILLIGRAM(S): at 00:00

## 2021-11-24 RX ADMIN — Medication 975 MILLIGRAM(S): at 13:30

## 2021-11-24 RX ADMIN — SIMETHICONE 80 MILLIGRAM(S): 80 TABLET, CHEWABLE ORAL at 20:32

## 2021-11-24 RX ADMIN — Medication 975 MILLIGRAM(S): at 06:10

## 2021-11-24 RX ADMIN — Medication 975 MILLIGRAM(S): at 19:00

## 2021-11-24 RX ADMIN — Medication 30 MILLIGRAM(S): at 02:23

## 2021-11-24 RX ADMIN — Medication 975 MILLIGRAM(S): at 18:02

## 2021-11-24 NOTE — LACTATION INITIAL EVALUATION - MILK SUPPLY
demonstrated hand expression, with small drops of colostrum, not want to attempt hand expression/colostrum

## 2021-11-24 NOTE — LACTATION INITIAL EVALUATION - PRO FEEDING PLAN INFANT OB
Pt has chosen to breast and formula feed./initiation of breastfeeding/breast milk feeding
initiation of breastfeeding/breast milk feeding

## 2021-11-24 NOTE — LACTATION INITIAL EVALUATION - NS LACT CON REASON FOR REQ
Spoke with RN who stated that pt declined a lactation consult tonight and infant will be formula fed in the nursery overnight. RN will call IBCLC if pt changes her mind./multiparous mom
Pt. is a P2 at 39.3 wks. gestation via repeat .  Pt. has hx of post partum depression receiving Zoloft.  Baby is 26 hrs. old, has received formula x7 .  Baby was able to achieve  a deep latch and sustained sucking for approx.1-2 minutes and then fell asleep on breast.  Attempted to wake baby but not interested in breastfeeding.  Baby was then fed 10 ml formula.   Discussed with pt. frequency of breastfeeding and importance of consistent stimulation to breasts to encourage milk supply.  Discussed possibility of pumping if interested.  At this time pt. appears tired and becoming comfortable postoperatively./multiparous mom

## 2021-11-25 ENCOUNTER — TRANSCRIPTION ENCOUNTER (OUTPATIENT)
Age: 36
End: 2021-11-25

## 2021-11-25 VITALS
HEART RATE: 65 BPM | OXYGEN SATURATION: 97 % | TEMPERATURE: 98 F | RESPIRATION RATE: 16 BRPM | SYSTOLIC BLOOD PRESSURE: 99 MMHG | DIASTOLIC BLOOD PRESSURE: 61 MMHG

## 2021-11-25 PROCEDURE — 90715 TDAP VACCINE 7 YRS/> IM: CPT

## 2021-11-25 PROCEDURE — 59050 FETAL MONITOR W/REPORT: CPT

## 2021-11-25 PROCEDURE — 36415 COLL VENOUS BLD VENIPUNCTURE: CPT

## 2021-11-25 PROCEDURE — 86900 BLOOD TYPING SEROLOGIC ABO: CPT

## 2021-11-25 PROCEDURE — 86769 SARS-COV-2 COVID-19 ANTIBODY: CPT

## 2021-11-25 PROCEDURE — 86850 RBC ANTIBODY SCREEN: CPT

## 2021-11-25 PROCEDURE — 86780 TREPONEMA PALLIDUM: CPT

## 2021-11-25 PROCEDURE — 86901 BLOOD TYPING SEROLOGIC RH(D): CPT

## 2021-11-25 PROCEDURE — 85025 COMPLETE CBC W/AUTO DIFF WBC: CPT

## 2021-11-25 RX ORDER — TETANUS TOXOID, REDUCED DIPHTHERIA TOXOID AND ACELLULAR PERTUSSIS VACCINE, ADSORBED 5; 2.5; 8; 8; 2.5 [IU]/.5ML; [IU]/.5ML; UG/.5ML; UG/.5ML; UG/.5ML
0.5 SUSPENSION INTRAMUSCULAR ONCE
Refills: 0 | Status: COMPLETED | OUTPATIENT
Start: 2021-11-25 | End: 2021-11-25

## 2021-11-25 RX ORDER — IBUPROFEN 200 MG
1 TABLET ORAL
Qty: 0 | Refills: 0 | DISCHARGE
Start: 2021-11-25

## 2021-11-25 RX ORDER — ACETAMINOPHEN 500 MG
3 TABLET ORAL
Qty: 0 | Refills: 0 | DISCHARGE
Start: 2021-11-25

## 2021-11-25 RX ADMIN — Medication 975 MILLIGRAM(S): at 01:00

## 2021-11-25 RX ADMIN — Medication 30 MILLIGRAM(S): at 04:00

## 2021-11-25 RX ADMIN — Medication 975 MILLIGRAM(S): at 06:12

## 2021-11-25 RX ADMIN — TETANUS TOXOID, REDUCED DIPHTHERIA TOXOID AND ACELLULAR PERTUSSIS VACCINE, ADSORBED 0.5 MILLILITER(S): 5; 2.5; 8; 8; 2.5 SUSPENSION INTRAMUSCULAR at 06:24

## 2021-11-25 RX ADMIN — Medication 30 MILLIGRAM(S): at 10:13

## 2021-11-25 RX ADMIN — Medication 975 MILLIGRAM(S): at 12:24

## 2021-11-25 RX ADMIN — SIMETHICONE 80 MILLIGRAM(S): 80 TABLET, CHEWABLE ORAL at 03:07

## 2021-11-25 RX ADMIN — Medication 30 MILLIGRAM(S): at 03:06

## 2021-11-25 RX ADMIN — Medication 30 MILLIGRAM(S): at 10:33

## 2021-11-25 RX ADMIN — Medication 975 MILLIGRAM(S): at 13:24

## 2021-11-25 RX ADMIN — Medication 975 MILLIGRAM(S): at 07:00

## 2021-11-25 RX ADMIN — Medication 975 MILLIGRAM(S): at 00:08

## 2021-11-25 NOTE — DISCHARGE NOTE OB - CARE PLAN
Principal Discharge DX:	Postpartum state  Assessment and plan of treatment:	Healthy mother and infant   1

## 2021-11-25 NOTE — DISCHARGE NOTE OB - MATERIALS PROVIDED
Vaccinations/  Immunization Record/Breastfeeding Log/Bottle Feeding Log/Breastfeeding Mother’s Support Group Information/Guide to Postpartum Care/[facility] Hearing Screen Program/Back To Sleep Handout/Shaken Baby Prevention Handout

## 2021-11-25 NOTE — PROGRESS NOTE ADULT - ASSESSMENT
A/P: 36y POD1 s/p repeat  section. Pt is hemodynamically stable.   -  Neuro-Pain control with motrin/acetaminophen, oxycodone for severe pain PRN  -  Cardio: no issues. Continue to monitor routinely   -  GI: Reg diet, Reglan/Zofran prn   -  : s/p shi- f/u TOV  -  DVT prophylaxis: SCDs  -  Activity- ambulate as tolerated   -  Heme: f/u post-op hemoglobin 
A/P: 36y POD2 s/p repeat  section. Pt is hemodynamically stable.   -  Neuro-Pain control with motrin/acetaminophen, oxycodone for severe pain PRN  -  Cardio: no issues. Continue to monitor routinely   -  GI: Reg diet   -  : voiding  -  DVT prophylaxis: frequent ambulation  -  Heme: post-op hemoglobin stable 
po

## 2021-11-25 NOTE — DISCHARGE NOTE OB - HOSPITAL COURSE
Patient underwent an uncomplicated repeat LTCS. Patient’s postoperative course was unremarkable and she remained hemodynamically stable and afebrile throughout. Upon discharge on POD3, the patient is ambulating and voiding spontaneously, tolerating oral intake, pain was well controlled with oral medication, and vital signs were stable.

## 2021-11-25 NOTE — DISCHARGE NOTE OB - PATIENT PORTAL LINK FT
You can access the FollowMyHealth Patient Portal offered by [facility] by registering at the following website: [email or web address]. By joining [facility]’s FollowMyHealth portal, you will also be able to view your health information using other applications (apps) compatible with our system.

## 2021-11-25 NOTE — DISCHARGE NOTE OB - CARE PROVIDER_API CALL
Tim Smith  OBSTETRICS AND GYNECOLOGY  [address] [place] [id number]  Phone: [phone]  Fax: [phone]  Follow Up Time:

## 2021-11-25 NOTE — PROGRESS NOTE ADULT - SUBJECTIVE AND OBJECTIVE BOX
Patient seen and evaluated at bedside. Pt states she has mild abdominal pain, overall controlled with pain medication. She is tolerating reg diet, passing flatus, and voiding.   She denies headache, dizziness, chest pain, palpitations, shortness of breath, nausea, vomiting, or heavy vaginal bleeding.    Physical Exam:  Vital Signs Last 24 Hrs  T(C): 36.7 (25 Nov 2021 06:33), Max: 36.8 (24 Nov 2021 13:08)  T(F): 98.1 (25 Nov 2021 06:33), Max: 98.3 (24 Nov 2021 13:08)  HR: 64 (25 Nov 2021 06:33) (62 - 73)  BP: 106/69 (25 Nov 2021 06:33) (89/52 - 106/69)  RR: 18 (25 Nov 2021 06:33) (16 - 18)  SpO2: 98% (25 Nov 2021 06:33) (94% - 98%)    GA: comfortable in NAD  Abd: soft, nontender, nondistended, no rebound or guarding, incision clean, dry and intact, uterus firm at midline  : voiding spontaneously, lochia WNL  Extremities: no swelling or calf tenderness                            9.3    12.94 )-----------( 224      ( 24 Nov 2021 07:27 )             29.0               acetaminophen     Tablet .. 975 milliGRAM(s) Oral <User Schedule>  diphenhydrAMINE 25 milliGRAM(s) Oral every 6 hours PRN  ibuprofen  Tablet. 600 milliGRAM(s) Oral every 6 hours  ketorolac   Injectable 30 milliGRAM(s) IV Push every 6 hours  lactated ringers. 1000 milliLiter(s) IV Continuous <Continuous>  lanolin Ointment 1 Application(s) Topical every 6 hours PRN  magnesium hydroxide Suspension 30 milliLiter(s) Oral two times a day PRN  melatonin 5 milliGRAM(s) Oral at bedtime  oxyCODONE    IR 5 milliGRAM(s) Oral once PRN  oxyCODONE    IR 5 milliGRAM(s) Oral every 3 hours PRN  oxytocin Infusion 333.333 milliUNIT(s)/Min IV Continuous <Continuous>  sertraline 50 milliGRAM(s) Oral every 24 hours  simethicone 80 milliGRAM(s) Chew every 4 hours PRN  
 Section Operative Note      Pre-Op Diagnosis:Repear  cesarea  section,  Fetal  macrosomia       Post-Op Diagnosis:same, anterios  lower  uterine  window       Time Out: 	[x ] Performed		[ ]Not Performed:  Procedure: 	 Section: 	[x ] Repeat 	#_______	[ ] Primary         [ ]Emergency	        [ ]Other____________:  Uterine incision:         [ x]Low Transverse C/S	[ ]Low Vertical C/S           [ ]Classical C/S							  Additional Procedures: 	[ ] Bilateral Tubal Ligation.  Type:______________  Other:	[ ]Lysis of Adhesions   	[ ]C-Hysterectomy          [ ]Other__________________:  Surgeon:  Dr. Smith                                                   .	Assist:   _______Fabio Moy____________.                                                                             .   Anesthesia: ___Arthur___________________________	Type: [ ]Epidural  [ x] Spinal   [ ] General	[ ]Other:  IV Fluids:1600cc  IRL, 2g  ancef  20  u ptocion				Urine Output:	75cc	Dotson:  [ x] Yes [ ]No [ ] Other:  EBL:    	900cc	    Delivery findings:    [ ] Live 	[ ]Non-Viable: 	[ ]MALE   [ ]FEMALE, Infant. APGAR              AND                 .  [ ] Peds at delivery.  [ ]MULTIPLE GESTATION -NOTES:      POSITION:      LOT                  PRESENTATION    VTX                            .  DELIVERED BY:  	[ x]HEAD 		[ ]BREECH 	[x ]OTHER:Nuchal  cord  reduuced   		[ x]MANUAL 		[ ]VACUUM	[ ] OTHER:	  PLACENTA: 	[x ]Removed	[ x]Uterus explored		[x ]Empty		[ ]Other 		  		UTERUS:  	[ x]Normal		[ ]Fibroids		[ ] Other:  ADNEXA: 	[ x]Right Normal	[ ]Other:  	[x ]Left Normal	[ ]Other :  PELVIS:	[ x]Normal		[ ]Adhesions [ ]Mild  [ ]Moderate [ ]Severe  Procedure:  	Patient in supine position.    Skin Incision	[x ]Pfannenstiel	[ ]Other:			[ ]Old scar  removal.  		Fascial Incision	[ x]Transverse 	[ ]Other:		  Peritoneal incision	[x ]Performed	[ x]Vertical  [ ]Other:		[ ]Lysis of Adhesions  		Bladder Flap	[ ]Created	[ ]Not Created  		Uterine Incision	[x ]Low transverse	[ ]Low Vertical	[ ]Classical  [ ]Other:   	Uterine Repair	[ x]_#_1_______Layers-Using__1 chromic______________ sutures 	[x ] hemostasis  excellent.  				[ ]Other:                       .                      Abdominal Cavity	[x ]Cleared of clots and debris  Rectus  Muscles  	Reapproximated [x ]Yes Using__1 chromic______________ sutures. [ ]Not Re- Approximated.  Fascial Reapproximation 	[ x]UsingUsing___1 Vicril_____________ sutures [ ]Other:                                 .	  Subcutaneous Tissue 	[x ]Irrigated  and hemostasis assured:[x ]Reapproximated Using____2-0____________ sutures.  Skin Reapproximation:	[x ]Subcuticular Using________________ sutures [x ] Insorb Staples   [ ]Skin Staples [ ]Other:  Dressing applied  and Patient to RR in  [x ] Stable [ ] Other ;                         condition.	  End of Operation;	[x ] Sponge/lap/needle count correct.  [ ] Not correct.  [ ]Not Done [ ]X-ray=Clear  Pathology:	[ x]Placenta.   	[ ]Fallopian Tube Portions [ ]Right [ ]Left.	[ ]Other:                                           	Complications/Attending’s Notes:	[ ] None.  	[ ] Other:                                                                        [ ]CONTINUATION OF NOTES NEXT PAGE:          									  
Patient seen and evaluated at bedside. While at bedside nurses helping pt OOB. When standing, pt reported dizziness and was helped back into bed. Also reports difficulty breathing when standing which was improved once laying back in bed as well. Pt is uncomfortable and states that she was not able to get very much sleep last night. Pt states she has moderate abdominal pain for which she just received tylenol. She is tolerating reg diet, not yet passing flatus. Shi removed, has not yet attempted to void.   She denies headache, blurry vision, dizziness, chest pain, palpitations, shortness of breath, nausea, vomiting, or heavy vaginal bleeding.    Physical Exam:  Vital Signs Last 24 Hrs  T(C): 36.7 (24 Nov 2021 06:17), Max: 36.7 (23 Nov 2021 17:01)  T(F): 98 (24 Nov 2021 06:17), Max: 98.1 (23 Nov 2021 17:01)  HR: 58 (24 Nov 2021 07:00) (57 - 72)  BP: 89/52 (24 Nov 2021 07:00) (86/55 - 120/66)  RR: 18 (24 Nov 2021 07:00) (16 - 18)  SpO2: 97% (24 Nov 2021 07:00) (95% - 100%)    GA: NAD  Abd: soft, appropriately tender, nondistended, no rebound or guarding. Abdominal dressing removed, incision clean, dry and intact with steri strips in place, uterus firm at midline  :s/p shi, lochia WNL  Extremities: minimal LE swelling, no calf tenderness                            11.5   9.43  )-----------( 274      ( 23 Nov 2021 10:31 )             35.2               acetaminophen     Tablet .. 975 milliGRAM(s) Oral <User Schedule>  diphenhydrAMINE 25 milliGRAM(s) Oral every 6 hours PRN  diphtheria/tetanus/pertussis (acellular) Vaccine (ADAcel) 0.5 milliLiter(s) IntraMuscular once  ibuprofen  Tablet. 600 milliGRAM(s) Oral every 6 hours  ketorolac   Injectable 30 milliGRAM(s) IV Push every 6 hours  lactated ringers. 1000 milliLiter(s) IV Continuous <Continuous>  lanolin Ointment 1 Application(s) Topical every 6 hours PRN  magnesium hydroxide Suspension 30 milliLiter(s) Oral two times a day PRN  melatonin 5 milliGRAM(s) Oral at bedtime  oxyCODONE    IR 5 milliGRAM(s) Oral once PRN  oxyCODONE    IR 5 milliGRAM(s) Oral every 3 hours PRN  oxytocin Infusion 333.333 milliUNIT(s)/Min IV Continuous <Continuous>  sertraline 50 milliGRAM(s) Oral every 24 hours  simethicone 80 milliGRAM(s) Chew every 4 hours PRN

## 2021-11-25 NOTE — DISCHARGE NOTE OB - MEDICATION SUMMARY - MEDICATIONS TO TAKE
I will START or STAY ON the medications listed below when I get home from the hospital:    acetaminophen 325 mg oral tablet  -- 3 tab(s) by mouth   -- Indication: For Postpartum pain    ibuprofen 600 mg oral tablet  -- 1 tab(s) by mouth every 6 hours  -- Indication: For Postpartum pain    Zoloft 50 mg oral tablet  -- 1 tab(s) by mouth once a day  -- Indication: For Depression

## 2021-11-30 ENCOUNTER — APPOINTMENT (OUTPATIENT)
Dept: ANTEPARTUM | Facility: CLINIC | Age: 36
End: 2021-11-30

## 2021-11-30 DIAGNOSIS — Z28.09 IMMUNIZATION NOT CARRIED OUT BECAUSE OF OTHER CONTRAINDICATION: ICD-10-CM

## 2021-11-30 DIAGNOSIS — O36.63X0 MATERNAL CARE FOR EXCESSIVE FETAL GROWTH, THIRD TRIMESTER, NOT APPLICABLE OR UNSPECIFIED: ICD-10-CM

## 2021-11-30 DIAGNOSIS — Z3A.39 39 WEEKS GESTATION OF PREGNANCY: ICD-10-CM

## 2021-11-30 DIAGNOSIS — O34.211 MATERNAL CARE FOR LOW TRANSVERSE SCAR FROM PREVIOUS CESAREAN DELIVERY: ICD-10-CM

## 2022-12-08 NOTE — DISCHARGE NOTE OB - NSTOBACCONEVERSMOKERY/N_GEN_A
FYI - Patient was scheduled for an office visit for muscle/back pain but called to cancel due to testing positive for Covid  She stated she has congestion and a fever but is able to manage her symptoms  She will call back to schedule a virtual visit if symptoms should worsen  No